# Patient Record
Sex: MALE | NOT HISPANIC OR LATINO | Employment: FULL TIME | URBAN - METROPOLITAN AREA
[De-identification: names, ages, dates, MRNs, and addresses within clinical notes are randomized per-mention and may not be internally consistent; named-entity substitution may affect disease eponyms.]

---

## 2018-05-01 ENCOUNTER — OFFICE VISIT (OUTPATIENT)
Dept: MEDICAL GROUP | Facility: MEDICAL CENTER | Age: 43
End: 2018-05-01
Payer: COMMERCIAL

## 2018-05-01 VITALS
WEIGHT: 173.4 LBS | SYSTOLIC BLOOD PRESSURE: 102 MMHG | HEART RATE: 110 BPM | OXYGEN SATURATION: 97 % | BODY MASS INDEX: 27.87 KG/M2 | RESPIRATION RATE: 12 BRPM | DIASTOLIC BLOOD PRESSURE: 64 MMHG | HEIGHT: 66 IN | TEMPERATURE: 99.4 F

## 2018-05-01 DIAGNOSIS — Z13.220 SCREENING, LIPID: ICD-10-CM

## 2018-05-01 DIAGNOSIS — Z13.1 DIABETES MELLITUS SCREENING: ICD-10-CM

## 2018-05-01 DIAGNOSIS — J06.9 VIRAL URI: ICD-10-CM

## 2018-05-01 DIAGNOSIS — Z76.89 ENCOUNTER TO ESTABLISH CARE: ICD-10-CM

## 2018-05-01 DIAGNOSIS — Z13.29 THYROID DISORDER SCREEN: ICD-10-CM

## 2018-05-01 PROCEDURE — 99203 OFFICE O/P NEW LOW 30 MIN: CPT | Performed by: NURSE PRACTITIONER

## 2018-05-01 ASSESSMENT — PATIENT HEALTH QUESTIONNAIRE - PHQ9: CLINICAL INTERPRETATION OF PHQ2 SCORE: 0

## 2018-05-02 NOTE — PROGRESS NOTES
"Subjective:     Chief Complaint   Patient presents with   • Establish Care   • Pharyngitis     Cesar Matamoros is a 42 y.o. male here to establish care and for evaluation of sore throat and cough. He is , has one daughter, he works for [employer]. Denies family history of diabetes, hypertension, hyperlipidemia. We discussed:    Viral URI  Sore throat, cough, chills starting 3 days ago. Slightly better today than yesterday. Taking over-the-counter Advil  Appetite and energy level are normal  No recent travel, no other sick family members  Denies asthma, COPD, tobacco use.  No shortness of breath, chest pain, otalgia       Current medicines (including changes today)  Current Outpatient Prescriptions   Medication Sig Dispense Refill   • hydrocodone-acetaminophen (NORCO) 5-325 MG Tab per tablet Take 1-2 Tabs by mouth every four hours as needed (mild pain). 15 Tab 0     No current facility-administered medications for this visit.      He  has no past medical history of Asthma; Diabetes (HCC); or Hypertension.    ROS included above     Objective:     Blood pressure 102/64, pulse (!) 110, temperature 37.4 °C (99.4 °F), resp. rate 12, height 1.665 m (5' 5.55\"), weight 78.7 kg (173 lb 6.4 oz), SpO2 97 %. Body mass index is 28.37 kg/m².     Physical Exam:  General: Alert, oriented in no acute distress.  Eye contact is good, speech is normal, affect calm  HEENT: Posterior oropharynx mildly red, tonsils 1+ without lesions or exudate. Nares clear. No maxillary or frontal sinus tenderness. Left TM gray and reflective with good landmarks, right canal occluded with cerumen. Mild bilateral tonsillar lymphadenopathy  Lungs: clear to auscultation bilaterally, normal effort, no wheeze/ rhonchi/ rales.  CV: regular rate and rhythm, S1, S2  Abdomen: soft, nontender  Ext: no edema, color normal, vascularity normal, temperature normal    Assessment and Plan:   The following treatment plan was discussed   1. Viral URI  3 days of " illness. Lungs clear on exam, well oxygenated on room air, in no acute distress. He does have a low-grade fever. Symptomatic treatment discussed including increasing fluids, rest, humidity, OTC analgesic/antipyretic as needed. Follow-up if not gradually resolving    2. Screening, lipid  LIPID PROFILE   3. Thyroid disorder screen  TSH WITH REFLEX TO FT4   4. Diabetes mellitus screening  BLOOD GLUCOSE   5. Encounter to establish care         Followup: Pending labs         Please note that this dictation was created using voice recognition software. I have worked with consultants from the vendor as well as technical experts from [facility] to optimize the interface. I have made every reasonable attempt to correct obvious errors, but I expect that there are errors of grammar and possibly content that I did not discover before finalizing the note.

## 2018-05-09 ENCOUNTER — PATIENT MESSAGE (OUTPATIENT)
Dept: MEDICAL GROUP | Facility: MEDICAL CENTER | Age: 43
End: 2018-05-09

## 2018-05-09 NOTE — TELEPHONE ENCOUNTER
From: Cesar Matamoros  To: SOBIA Mccray  Sent: 5/9/2018 1:33 PM PDT  Subject: Non-Urgent Medical Question    Royn Cazares  I was able to set up my id with [facility] website. Just wanted to check with you on scheduling with the [facility] labs for my blood samples.  Thanks

## 2018-05-20 ENCOUNTER — PATIENT MESSAGE (OUTPATIENT)
Dept: MEDICAL GROUP | Facility: MEDICAL CENTER | Age: 43
End: 2018-05-20

## 2018-05-22 ENCOUNTER — TELEPHONE (OUTPATIENT)
Dept: MEDICAL GROUP | Facility: MEDICAL CENTER | Age: 43
End: 2018-05-22

## 2018-05-22 NOTE — TELEPHONE ENCOUNTER
Regarding: RE: Non-Urgent Medical Question  Contact: [phone]  ----- Message from SOBIA Mccray sent at 5/18/2018  7:41 AM PDT -----       ----- Message sent from SOBIA Mccray to Cesar Matamoros at 5/18/2018  7:41 AM -----   Hi Aiden,  I have not received anything yet. We will check with them today.  Shereen VILLATORO      ----- Message -----     From: Cesar Matamoros     Sent: 5/16/2018  2:51 PM PDT       To: SOBIA Mccray  Subject: Non-Urgent Medical Question    Hi Debora  Today I gave my blood samples at the [facility] in [facility], Please let me know when you have the results so that I can schedule a health Visit.  Thanks  Aiden   ----- Message -----  From: SOBIA Mccray  Sent: 5/9/2018  2:07 PM PDT  To: Sydneerolanddomenic Saturnino  Subject: RE: Non-Urgent Medical Question  Great, glad you signed up for [facility].  You will need to contact [facility] directly to schedule an appointment. They also take walk-ins. Be sure to take the paperwork that we gave you for your lab order  Shereen VILLATORO            ----- Message -----     From: Cesar Matamoros     Sent: 5/9/2018  1:33 PM PDT       To: SOBIA Mccray  Subject: Non-Urgent Medical Question    Hi Debora  I was able to set up my id with [facility] website. Just wanted to check with you on scheduling with the [facility] labs for my blood samples.  Thanks

## 2018-05-23 ENCOUNTER — PATIENT MESSAGE (OUTPATIENT)
Dept: MEDICAL GROUP | Facility: MEDICAL CENTER | Age: 43
End: 2018-05-23

## 2018-05-23 DIAGNOSIS — E05.90 HYPERTHYROIDISM: ICD-10-CM

## 2018-05-23 NOTE — TELEPHONE ENCOUNTER
From: Cesar Matamoros  To: SOBIA Mccray  Sent: 5/23/2018 12:04 PM PDT  Subject: RE:labs    Can you please send me an attachment so that I can take a print out and start.  ----- Message -----  From: SOBIA Mccray  Sent: 5/23/2018 12:01 PM PDT  To: Cesar Valenciacurtissandrineteresa  Subject: RE:labs  I have already ordered the tests, they can look it up in the computer. You can schedule an appointment or just walk in.  Shereen VILLATORO      ----- Message -----   From: Cesar Matamoros   Sent: 5/23/2018 11:56 AM PDT   To: SOBIA Mccray  Subject: RE:jackie Cazares  Thank you, will try to book an time with the lab located in the ER. Do I need to come and collect the paper work from you before booking or I will book the appointment and later you can send the information over.  Thanks  Aiden   ----- Message -----  From: SOBIA Mccray  Sent: 5/23/2018 11:50 AM PDT  To: Sydneeminal Saturnino  Subject: RE:labs  There is a lab at the [facility] location in the ER.  Shereen VILLATORO      ----- Message -----   From: Cesar Matamoros   Sent: 5/23/2018 11:48 AM PDT   To: SOBIA Mccray  Subject: RE:jackie Cazares  My wife told me that she used to do her blood samples in the [facility] building where I came for check up. If you could advise if you have a lab for blood tests, then I can schedule an appointment there as it is very close to my house.  Thanks  Aiden   ----- Message -----  From: SOBIA Mccray  Sent: 5/20/2018 4:26 PM PDT  To: Sydneerolanddomenic Valenciamarina  Subject: labs  Aiden,  I have received your labs and it looks like your thyroid is overactive, which we call hyperthyroidism. I would like to have you return to lab and recheck this to be sure it is accurate. Would you like to  paperwork, or we can fax it to the lab location of your choice- let me know.  Shereen VILLATORO

## 2018-05-23 NOTE — TELEPHONE ENCOUNTER
From: Cesar Matamoros  To: SOBIA Mccray  Sent: 5/23/2018 11:56 AM PDT  Subject: RE:jackie Cazares  Thank you, will try to book an time with the lab located in the ER. Do I need to come and collect the paper work from you before booking or I will book the appointment and later you can send the information over.  Thanks  Aiden   ----- Message -----  From: SOBIA Mccray  Sent: 5/23/2018 11:50 AM PDT  To: Cesar Matamoros  Subject: RE:labs  There is a lab at the [facility] location in the ER.  Shereen VILLATORO      ----- Message -----   From: Cesar Matamoros   Sent: 5/23/2018 11:48 AM PDT   To: SOBIA Mccray  Subject: RE:jackie Cazares  My wife told me that she used to do her blood samples in the [facility] building where I came for check up. If you could advise if you have a lab for blood tests, then I can schedule an appointment there as it is very close to my house.  Thanks  Aiden   ----- Message -----  From: SOBIA Mccray  Sent: 5/20/2018 4:26 PM PDT  To: Gideondomenic Saturnino  Subject: labs  Aiden,  I have received your labs and it looks like your thyroid is overactive, which we call hyperthyroidism. I would like to have you return to lab and recheck this to be sure it is accurate. Would you like to  paperwork, or we can fax it to the lab location of your choice- let me know.  Shereen VILLATORO

## 2019-06-04 ENCOUNTER — OFFICE VISIT (OUTPATIENT)
Dept: URGENT CARE | Facility: CLINIC | Age: 44
End: 2019-06-04
Payer: COMMERCIAL

## 2019-06-04 VITALS
DIASTOLIC BLOOD PRESSURE: 72 MMHG | TEMPERATURE: 98.5 F | RESPIRATION RATE: 12 BRPM | HEART RATE: 97 BPM | SYSTOLIC BLOOD PRESSURE: 118 MMHG | BODY MASS INDEX: 28.12 KG/M2 | WEIGHT: 175 LBS | OXYGEN SATURATION: 97 % | HEIGHT: 66 IN

## 2019-06-04 DIAGNOSIS — Z71.1 WORRIED WELL: Primary | ICD-10-CM

## 2019-06-04 PROCEDURE — 99213 OFFICE O/P EST LOW 20 MIN: CPT | Performed by: PHYSICIAN ASSISTANT

## 2019-06-05 NOTE — PROGRESS NOTES
Subjective:      Pt is a 43 y.o. male who presents with Abdominal Pain (X 1 week ABD pain on left side )            HPI  This is a new problem. Pt notes 2 hours of LLQ abd pain from 5:45am to 7:45am on 5/26/19 without NVD or constipation but has been pain free the last 11 days. Pt notes it completely resolved after drinking a glass of water. Pt has not taken any Rx medications for this condition. Pt states the pain is currently a 0/10. Pt denies CP, SOB, NVD, paresthesias, headaches, dizziness, change in vision, hives, or other joint pain. The pt's medication list, problem list, and allergies have been evaluated and reviewed during today's visit.    PMH:  Negative per pt.      PSH:  Negative per pt.      Fam Hx:  the patient's family history is not pertinent to their current complaint    Family Status   Relation Status   • Mo Alive   • Fa Alive   • Sis Alive   • Monica Alive       Soc HX:  Social History     Social History   • Marital status:      Spouse name: N/A   • Number of children: N/A   • Years of education: N/A     Occupational History   • Not on file.     Social History Main Topics   • Smoking status: Never Smoker   • Smokeless tobacco: Never Used   • Alcohol use Yes      Comment: rarely    • Drug use: No   • Sexual activity: Not on file     Other Topics Concern   • Not on file     Social History Narrative   • No narrative on file         Medications:    Current Outpatient Prescriptions:   •  hydrocodone-acetaminophen (NORCO) 5-325 MG Tab per tablet, Take 1-2 Tabs by mouth every four hours as needed (mild pain)., Disp: 15 Tab, Rfl: 0      Allergies:  Other food    ROS  Constitutional: Negative for fever, chills and malaise/fatigue.   HENT: Negative for congestion and sore throat.    Eyes: Negative for blurred vision, double vision and photophobia.   Respiratory: Negative for cough and shortness of breath.  Cardiovascular: Negative for chest pain and palpitations.   Gastrointestinal: POS for LLQ abd pain  "and NEG for heartburn, nausea, vomiting, diarrhea and constipation.   Genitourinary: Negative for dysuria and flank pain.   Musculoskeletal: Negative for joint pain and myalgias.   Skin: Negative for itching and rash.   Neurological: Negative for dizziness, tingling and headaches.   Endo/Heme/Allergies: Does not bruise/bleed easily.   Psychiatric/Behavioral: Negative for depression. The patient is not nervous/anxious.           Objective:     /72 (BP Location: Left arm, Patient Position: Sitting, BP Cuff Size: Adult)   Pulse 97   Temp 36.9 °C (98.5 °F) (Temporal)   Resp 12   Ht 1.665 m (5' 5.55\")   Wt 79.4 kg (175 lb)   SpO2 97%   BMI 28.63 kg/m²      Physical Exam      Constitutional: PT is oriented to person, place, and time. PT appears well-developed and well-nourished. No distress.   HENT:   Head: Normocephalic and atraumatic.   Mouth/Throat: Oropharynx is clear and moist. No oropharyngeal exudate.   Eyes: Conjunctivae normal and EOM are normal. Pupils are equal, round, and reactive to light.   Neck: Normal range of motion. Neck supple. No thyromegaly present.   Cardiovascular: Normal rate, regular rhythm, normal heart sounds and intact distal pulses.  Exam reveals no gallop and no friction rub.    No murmur heard.  Pulmonary/Chest: Effort normal and breath sounds normal. No respiratory distress. PT has no wheezes. PT has no rales. Pt exhibits no tenderness.   Abdominal: Soft. Bowel sounds are normal. PT exhibits no distension and no mass. There is no tenderness. There is no rebound and no guarding.   Musculoskeletal: Normal range of motion. PT exhibits no edema and no tenderness.   Neurological: PT is alert and oriented to person, place, and time. PT has normal reflexes. No cranial nerve deficit.   Skin: Skin is warm and dry. No rash noted. PT is not diaphoretic. No erythema.       Psychiatric: PT has a normal mood and affect. PT behavior is normal. Judgment and thought content normal.        "   Assessment/Plan:     1. Worried well      All symptoms resolved 11 days ago.  Comp physical exam-->WNL  Rest, fluids encouraged.  AVS with medical info given.  Pt was in full understanding and agreement with the plan.  Differential diagnosis, natural history, supportive care, and indications for immediate follow-up discussed. All questions answered. Patient agrees with the plan of care.  Follow-up as needed if symptoms worsen or fail to improve.

## 2019-06-05 NOTE — PATIENT INSTRUCTIONS
Abdominal Pain, Adult  Many things can cause belly (abdominal) pain. Most times, belly pain is not dangerous. Many cases of belly pain can be watched and treated at home. Sometimes belly pain is serious, though. Your doctor will try to find the cause of your belly pain.  Follow these instructions at home:  · Take over-the-counter and prescription medicines only as told by your doctor. Do not take medicines that help you poop (laxatives) unless told to by your doctor.  · Drink enough fluid to keep your pee (urine) clear or pale yellow.  · Watch your belly pain for any changes.  · Keep all follow-up visits as told by your doctor. This is important.  Contact a doctor if:  · Your belly pain changes or gets worse.  · You are not hungry, or you lose weight without trying.  · You are having trouble pooping (constipated) or have watery poop (diarrhea) for more than 2-3 days.  · You have pain when you pee or poop.  · Your belly pain wakes you up at night.  · Your pain gets worse with meals, after eating, or with certain foods.  · You are throwing up and cannot keep anything down.  · You have a fever.  Get help right away if:  · Your pain does not go away as soon as your doctor says it should.  · You cannot stop throwing up.  · Your pain is only in areas of your belly, such as the right side or the left lower part of the belly.  · You have bloody or black poop, or poop that looks like tar.  · You have very bad pain, cramping, or bloating in your belly.  · You have signs of not having enough fluid or water in your body (dehydration), such as:  ¨ Dark pee, very little pee, or no pee.  ¨ Cracked lips.  ¨ Dry mouth.  ¨ Sunken eyes.  ¨ Sleepiness.  ¨ Weakness.  This information is not intended to replace advice given to you by your health care provider. Make sure you discuss any questions you have with your health care provider.  Document Released: 06/05/2009 Document Revised: 07/07/2017 Document Reviewed: 05/31/2017  [email or web address]  Interactive Patient Education © 2017 [employer]

## 2021-01-21 ENCOUNTER — OFFICE VISIT (OUTPATIENT)
Dept: MEDICAL GROUP | Facility: MEDICAL CENTER | Age: 46
End: 2021-01-21
Payer: COMMERCIAL

## 2021-01-21 ENCOUNTER — HOSPITAL ENCOUNTER (OUTPATIENT)
Dept: RADIOLOGY | Facility: MEDICAL CENTER | Age: 46
End: 2021-01-21
Attending: NURSE PRACTITIONER
Payer: COMMERCIAL

## 2021-01-21 VITALS
HEART RATE: 67 BPM | BODY MASS INDEX: 26.83 KG/M2 | DIASTOLIC BLOOD PRESSURE: 74 MMHG | OXYGEN SATURATION: 100 % | HEIGHT: 68 IN | TEMPERATURE: 96.9 F | WEIGHT: 177 LBS | SYSTOLIC BLOOD PRESSURE: 110 MMHG

## 2021-01-21 DIAGNOSIS — M25.561 ACUTE PAIN OF RIGHT KNEE: ICD-10-CM

## 2021-01-21 PROCEDURE — 73562 X-RAY EXAM OF KNEE 3: CPT | Mod: RT

## 2021-01-21 PROCEDURE — 99213 OFFICE O/P EST LOW 20 MIN: CPT | Performed by: NURSE PRACTITIONER

## 2021-01-21 ASSESSMENT — PATIENT HEALTH QUESTIONNAIRE - PHQ9: CLINICAL INTERPRETATION OF PHQ2 SCORE: 0

## 2021-01-21 NOTE — PROGRESS NOTES
"Subjective:     Chief Complaint   Patient presents with   • Knee Pain     Cesar Matamoros is a 45 y.o. male here today to follow up on:    Acute pain of right knee  This is a new problem for evaluation today  He started playing badminton a few weeks ago, is playing for about 5 hours several days a week and then began having pain in the right knee.  Since it seems to get worse the longer he plays.  Does not bother him at all on days that he is not playing badminton.  Does not bother him walking up or down stairs or at night in bed.  He has had no swelling, no instability, no joint erythema       Current medicines (including changes today)  Current Outpatient Medications   Medication Sig Dispense Refill   • hydrocodone-acetaminophen (NORCO) 5-325 MG Tab per tablet Take 1-2 Tabs by mouth every four hours as needed (mild pain). 15 Tab 0     No current facility-administered medications for this visit.      He  has no past medical history of Asthma, Diabetes (HCC), or Hypertension.    ROS included above     Objective:     /74 (BP Location: Right arm, Patient Position: Sitting)   Pulse 67   Temp 36.1 °C (96.9 °F) (Temporal)   Ht 1.727 m (5' 8\")   Wt 80.3 kg (177 lb)   SpO2 100%  Body mass index is 26.91 kg/m².     Physical Exam:  General: Alert, oriented in no acute distress.  Eye contact is good, speech is normal, affect calm  Lungs: clear to auscultation bilaterally, normal effort, no wheeze/ rhonchi/ rales.  CV: regular rate and rhythm, S1, S2, no murmur  MS: No tenderness over the right knee joint, patellar tendon, popliteal fossa.  No swelling.  Normal flexion extension  Ext: no edema, color normal, vascularity normal, temperature normal    Assessment and Plan:   The following treatment plan was discussed   1. Acute pain of right knee   gradual onset of right knee pain exacerbated by running.  No instability, no specific history of injury.  Encourage co-Q10 supplement, Aleve as needed with food.  " Will refer for PT eval  REFERRAL TO PHYSICAL THERAPY    DX-KNEE 3 VIEWS RIGHT       Followup: Pending x-ray         Please note that this dictation was created using voice recognition software. I have worked with consultants from the vendor as well as technical experts from [facility] to optimize the interface. I have made every reasonable attempt to correct obvious errors, but I expect that there are errors of grammar and possibly content that I did not discover before finalizing the note.

## 2021-02-05 ENCOUNTER — APPOINTMENT (OUTPATIENT)
Dept: PHYSICAL THERAPY | Facility: MEDICAL CENTER | Age: 46
End: 2021-02-05
Attending: NURSE PRACTITIONER
Payer: COMMERCIAL

## 2021-02-09 ENCOUNTER — APPOINTMENT (OUTPATIENT)
Dept: PHYSICAL THERAPY | Facility: MEDICAL CENTER | Age: 46
End: 2021-02-09
Payer: COMMERCIAL

## 2022-01-14 ENCOUNTER — TELEMEDICINE (OUTPATIENT)
Dept: MEDICAL GROUP | Facility: LAB | Age: 47
End: 2022-01-14
Payer: COMMERCIAL

## 2022-01-14 VITALS — HEIGHT: 68 IN | BODY MASS INDEX: 26.91 KG/M2

## 2022-01-14 DIAGNOSIS — Z13.6 ENCOUNTER FOR SCREENING FOR CARDIOVASCULAR DISORDERS: ICD-10-CM

## 2022-01-14 DIAGNOSIS — U07.1 COVID-19: ICD-10-CM

## 2022-01-14 PROCEDURE — 99212 OFFICE O/P EST SF 10 MIN: CPT | Mod: 95 | Performed by: FAMILY MEDICINE

## 2022-01-14 NOTE — PROGRESS NOTES
"Virtual Visit: Established Patient   This visit was conducted via Zoom using secure and encrypted videoconferencing technology.   The patient was in a private location in the state of [place].    The patient's identity was confirmed and verbal consent was obtained for this virtual visit.    Subjective:   CC:   Chief Complaint   Patient presents with   • Coronavirus Screening       Cesar Matamoros is a 46 y.o. male presenting for evaluation and management of:    Positive test last Tuesday, repeat test yesterday was now negative. Still struggling with some loss of taste and smell. Never short of breath. Has had 2/3 Pfizer vaccines.   No chronic lung issues in there past. No current issues.   Last labs a few years ago. No known abnormality, in the past some mildly elevated cholesterol. No family history of HLD, stroke, MI.   Dad diagnosed with diabetes very late, age 70.   , daughter 7 years old. Everyone got through COVID just fine.   Diet: Does eat rice a lot. Eats meat, lots of veggies. Mostly chicken some goat.   Exercise: Joined a gym but for the last few months work has made this hard, very busy, works for [employer]. .     ROS   See above. No GI upset or nausea.     Current medicines (including changes today)  Current Outpatient Medications   Medication Sig Dispense Refill   • hydrocodone-acetaminophen (NORCO) 5-325 MG Tab per tablet Take 1-2 Tabs by mouth every four hours as needed (mild pain). 15 Tab 0     No current facility-administered medications for this visit.       Patient Active Problem List    Diagnosis Date Noted   • Acute pain of right knee 01/21/2021   • Viral URI 05/01/2018        Objective:   Ht 1.727 m (5' 8\")   BMI 26.91 kg/m²     Physical Exam:  Constitutional: Alert, no distress, well-groomed.  Skin: No rashes in visible areas.  Eye: Round. Conjunctiva clear, lids normal. No icterus.   ENMT: Lips pink without lesions, good dentition, moist mucous membranes. " Phonation normal.  Neck: No masses, no thyromegaly. Moves freely without pain.  Respiratory: Unlabored respiratory effort, no cough or audible wheeze  Psych: Alert and oriented x3, normal affect and mood.     Assessment and Plan:   The following treatment plan was discussed:   1. Encounter for screening for cardiovascular disorders  Reviewed family history and personal history.  Discussed diet and exercise recommendations.  We will get some updated blood work and he will come in in person for us to take a listen to his heart and lungs and review his lab work at that time as well.  - CBC WITH DIFFERENTIAL; Future  - Lipid Profile; Future  - Comp Metabolic Panel; Future  - HEMOGLOBIN A1C; Future  - VITAMIN D,25 HYDROXY; Future    2. COVID-19  He is recovering from COVID well.  He did have a negative test yesterday so he is okay to go back to work and he has been out for more than 10 days at this point.    Follow-up: No follow-ups on file.

## 2022-03-10 NOTE — ASSESSMENT & PLAN NOTE
Detail Level: Simple This is a new problem for evaluation today  He started playing OTOY a few weeks ago, is playing for about 5 hours several days a week and then began having pain in the right knee.  Since it seems to get worse the longer he plays.  Does not bother him at all on days that he is not playing badminton.  Does not bother him walking up or down stairs or at night in bed.  He has had no swelling, no instability, no joint erythema   Additional Notes: Patient consent was obtained to proceed with the visit and recommended plan of care after discussion of all risks and benefits, including the risks of COVID-19 exposure. Render Risk Assessment In Note?: yes

## 2022-12-15 ENCOUNTER — HOSPITAL ENCOUNTER (OUTPATIENT)
Facility: MEDICAL CENTER | Age: 47
End: 2022-12-15
Attending: NURSE PRACTITIONER
Payer: COMMERCIAL

## 2022-12-15 ENCOUNTER — OFFICE VISIT (OUTPATIENT)
Dept: URGENT CARE | Facility: CLINIC | Age: 47
End: 2022-12-15
Payer: COMMERCIAL

## 2022-12-15 VITALS
RESPIRATION RATE: 16 BRPM | SYSTOLIC BLOOD PRESSURE: 126 MMHG | TEMPERATURE: 98.7 F | BODY MASS INDEX: 31.83 KG/M2 | HEIGHT: 62 IN | OXYGEN SATURATION: 97 % | HEART RATE: 110 BPM | DIASTOLIC BLOOD PRESSURE: 86 MMHG | WEIGHT: 173 LBS

## 2022-12-15 DIAGNOSIS — J06.9 VIRAL URI WITH COUGH: ICD-10-CM

## 2022-12-15 LAB
FLUAV+FLUBV AG SPEC QL IA: NEGATIVE
INT CON NEG: NORMAL
INT CON POS: NORMAL

## 2022-12-15 PROCEDURE — 99213 OFFICE O/P EST LOW 20 MIN: CPT | Performed by: NURSE PRACTITIONER

## 2022-12-15 PROCEDURE — 0240U HCHG SARS-COV-2 COVID-19 NFCT DS RESP RNA 3 TRGT MIC: CPT

## 2022-12-15 PROCEDURE — 87804 INFLUENZA ASSAY W/OPTIC: CPT | Performed by: NURSE PRACTITIONER

## 2022-12-15 ASSESSMENT — ENCOUNTER SYMPTOMS
MYALGIAS: 1
WHEEZING: 0
SHORTNESS OF BREATH: 0
COUGH: 1
FEVER: 0
DIARRHEA: 0
SORE THROAT: 1
NAUSEA: 0
HEMOPTYSIS: 0
VOMITING: 0

## 2022-12-15 NOTE — PROGRESS NOTES
"  Subjective:     Cesar Matamoros is a 47 y.o. male who presents for Cough (\"X2 days, pressure in head. Slight body aches\" )      Had been a dry cough, flem this morning. Sore throat 2/10. Ibuprofen.     Cough  This is a new problem. The current episode started yesterday. Associated symptoms include myalgias and a sore throat. Pertinent negatives include no ear pain, fever, hemoptysis, shortness of breath or wheezing.     No past medical history on file.    No past surgical history on file.    Social History     Socioeconomic History    Marital status:      Spouse name: Not on file    Number of children: Not on file    Years of education: Not on file    Highest education level: Not on file   Occupational History    Not on file   Tobacco Use    Smoking status: Never    Smokeless tobacco: Never   Vaping Use    Vaping Use: Never used   Substance and Sexual Activity    Alcohol use: Yes     Comment: rarely     Drug use: No    Sexual activity: Yes     Partners: Female   Other Topics Concern    Not on file   Social History Narrative    Not on file     Social Determinants of Health     Financial Resource Strain: Not on file   Food Insecurity: Not on file   Transportation Needs: Not on file   Physical Activity: Not on file   Stress: Not on file   Social Connections: Not on file   Intimate Partner Violence: Not on file   Housing Stability: Not on file        Family History   Problem Relation Age of Onset    Diabetes Father     Cancer Neg Hx     Hypertension Neg Hx     Hyperlipidemia Neg Hx     Heart Disease Neg Hx     Stroke Neg Hx         Allergies   Allergen Reactions    Other Food      mushrooms       Review of Systems   Constitutional:  Positive for malaise/fatigue. Negative for fever.   HENT:  Positive for congestion and sore throat. Negative for ear pain.    Respiratory:  Positive for cough. Negative for hemoptysis, shortness of breath and wheezing.    Gastrointestinal:  Negative for diarrhea, nausea and " "vomiting.   Musculoskeletal:  Positive for myalgias.   All other systems reviewed and are negative.     Objective:   /86 (BP Location: Right arm, Patient Position: Sitting, BP Cuff Size: Adult)   Pulse (!) 110   Temp 37.1 °C (98.7 °F) (Temporal)   Resp 16   Ht 1.575 m (5' 2\")   Wt 78.5 kg (173 lb)   SpO2 97%   BMI 31.64 kg/m²     Physical Exam  Vitals reviewed.   Constitutional:       General: He is not in acute distress.     Appearance: He is well-developed. He is ill-appearing. He is not toxic-appearing.   HENT:      Head: Normocephalic and atraumatic.      Right Ear: Ear canal and external ear normal. Tympanic membrane is not erythematous.      Left Ear: Ear canal and external ear normal. Tympanic membrane is not erythematous.      Nose: Mucosal edema present.      Mouth/Throat:      Lips: Pink.      Mouth: Mucous membranes are moist. No oral lesions.      Pharynx: Uvula midline. Posterior oropharyngeal erythema present.      Tonsils: No tonsillar exudate or tonsillar abscesses. 1+ on the right. 1+ on the left.   Eyes:      Conjunctiva/sclera: Conjunctivae normal.   Cardiovascular:      Rate and Rhythm: Normal rate.   Pulmonary:      Effort: Pulmonary effort is normal. No respiratory distress.      Breath sounds: Normal breath sounds. No wheezing, rhonchi or rales.   Musculoskeletal:         General: Normal range of motion.      Cervical back: Normal range of motion and neck supple.   Skin:     General: Skin is warm and dry.      Findings: No rash.   Neurological:      General: No focal deficit present.      Mental Status: He is alert and oriented to person, place, and time.      GCS: GCS eye subscore is 4. GCS verbal subscore is 5. GCS motor subscore is 6.   Psychiatric:         Speech: Speech normal.         Behavior: Behavior normal.         Thought Content: Thought content normal.         Judgment: Judgment normal.       Assessment/Plan:   1. Viral URI with cough  - POCT Influenza A/B: " Negative  - SARS-CoV-2 PCR (24 hour In-House): Collect NP swab in V[facility]; Future  Results for orders placed or performed in visit on 12/15/22   POCT Influenza A/B   Result Value Ref Range    Rapid Influenza A-B Negative     Internal Control Positive Valid     Internal Control Negative Valid    Symptomatic care.  -Oral hydration and rest.   -Cough control: nonpharmacologic options for cough relief such as throat lozenges, hot tea, honey.  -Over the counter expectorant as directed; Guaifenesin (Mucinex).  -Tylenol or ibuprofen for pain and fever as directed.   -Warm salt water gargles.  -OTC Throat lozenges or spray (Cepacol).    Seek emergency medical care immediately for: Trouble breathing, persistent pain or pressure in the chest, confusion, inability to wake or stay awake, bluish lips or face, persistent tachycardia (fast heart rate), prolonged dizziness, persistent high grade fevers. Follow up for prolonged cough, persistent wheezing, persistent throat pain, difficulty swallowing, persistent fevers, leg swelling, or any other concerns. Follow up with your Primary Care Provider.     -Discussed viral etiology. COVID S&S, and self isolation guidelines. S&S of PNA with follow up. Stable Vitals.    Differential diagnosis, natural history, supportive care, and indications for immediate follow-up discussed.

## 2022-12-16 LAB
FLUAV RNA SPEC QL NAA+PROBE: NEGATIVE
FLUBV RNA SPEC QL NAA+PROBE: NEGATIVE
SARS-COV-2 RNA RESP QL NAA+PROBE: NOTDETECTED
SPECIMEN SOURCE: NORMAL